# Patient Record
Sex: FEMALE | Race: OTHER | NOT HISPANIC OR LATINO
[De-identification: names, ages, dates, MRNs, and addresses within clinical notes are randomized per-mention and may not be internally consistent; named-entity substitution may affect disease eponyms.]

---

## 2020-07-27 PROBLEM — Z00.00 ENCOUNTER FOR PREVENTIVE HEALTH EXAMINATION: Status: ACTIVE | Noted: 2020-07-27

## 2020-07-30 ENCOUNTER — APPOINTMENT (OUTPATIENT)
Dept: ORTHOPEDIC SURGERY | Facility: CLINIC | Age: 80
End: 2020-07-30
Payer: MEDICARE

## 2020-07-30 VITALS
OXYGEN SATURATION: 98 % | WEIGHT: 135 LBS | BODY MASS INDEX: 24.84 KG/M2 | HEIGHT: 62 IN | SYSTOLIC BLOOD PRESSURE: 118 MMHG | HEART RATE: 62 BPM | DIASTOLIC BLOOD PRESSURE: 50 MMHG

## 2020-07-30 VITALS — TEMPERATURE: 97.9 F

## 2020-07-30 DIAGNOSIS — Z86.79 PERSONAL HISTORY OF OTHER DISEASES OF THE CIRCULATORY SYSTEM: ICD-10-CM

## 2020-07-30 DIAGNOSIS — Z86.39 PERSONAL HISTORY OF OTHER ENDOCRINE, NUTRITIONAL AND METABOLIC DISEASE: ICD-10-CM

## 2020-07-30 DIAGNOSIS — Z82.61 FAMILY HISTORY OF ARTHRITIS: ICD-10-CM

## 2020-07-30 DIAGNOSIS — M19.019 PRIMARY OSTEOARTHRITIS, UNSPECIFIED SHOULDER: ICD-10-CM

## 2020-07-30 DIAGNOSIS — M75.01 ADHESIVE CAPSULITIS OF RIGHT SHOULDER: ICD-10-CM

## 2020-07-30 PROCEDURE — 99204 OFFICE O/P NEW MOD 45 MIN: CPT

## 2020-07-30 RX ORDER — INSULIN ASPART 100 [IU]/ML
100 INJECTION, SOLUTION INTRAVENOUS; SUBCUTANEOUS
Qty: 15 | Refills: 0 | Status: ACTIVE | COMMUNITY
Start: 2020-06-16

## 2020-07-30 RX ORDER — NIFEDIPINE 30 MG/1
30 TABLET, EXTENDED RELEASE ORAL
Qty: 90 | Refills: 0 | Status: ACTIVE | COMMUNITY
Start: 2020-06-16

## 2020-07-30 RX ORDER — MIRTAZAPINE 7.5 MG/1
7.5 TABLET, FILM COATED ORAL
Qty: 90 | Refills: 0 | Status: ACTIVE | COMMUNITY
Start: 2020-05-20

## 2020-07-30 RX ORDER — HALOBETASOL PROPIONATE 0.5 MG/G
0.05 OINTMENT TOPICAL
Qty: 50 | Refills: 0 | Status: ACTIVE | COMMUNITY
Start: 2020-04-07

## 2020-07-30 RX ORDER — METFORMIN HYDROCHLORIDE 1000 MG/1
1000 TABLET, COATED ORAL
Qty: 180 | Refills: 0 | Status: ACTIVE | COMMUNITY
Start: 2020-06-16

## 2020-07-30 RX ORDER — PEN NEEDLE, DIABETIC 32GX 5/32"
NEEDLE, DISPOSABLE MISCELLANEOUS
Qty: 100 | Refills: 0 | Status: ACTIVE | COMMUNITY
Start: 2020-07-16

## 2020-07-30 RX ORDER — KETOCONAZOLE FOAM 20 MG/G
2 AEROSOL, FOAM TOPICAL
Qty: 100 | Refills: 0 | Status: ACTIVE | COMMUNITY
Start: 2020-02-11

## 2020-07-30 RX ORDER — BLOOD-GLUCOSE METER
31G X 6 MM EACH MISCELLANEOUS
Qty: 100 | Refills: 0 | Status: ACTIVE | COMMUNITY
Start: 2020-07-16

## 2020-07-30 RX ORDER — HYDROCHLOROTHIAZIDE 25 MG/1
25 TABLET ORAL
Qty: 90 | Refills: 0 | Status: ACTIVE | COMMUNITY
Start: 2020-07-16

## 2020-07-30 RX ORDER — TRIAMCINOLONE ACETONIDE 1 MG/G
0.1 CREAM TOPICAL
Qty: 30 | Refills: 0 | Status: ACTIVE | COMMUNITY
Start: 2020-02-27

## 2020-07-30 RX ORDER — ATORVASTATIN CALCIUM 40 MG/1
40 TABLET, FILM COATED ORAL
Qty: 30 | Refills: 0 | Status: ACTIVE | COMMUNITY
Start: 2020-06-29

## 2020-07-30 RX ORDER — BLOOD SUGAR DIAGNOSTIC
STRIP MISCELLANEOUS
Qty: 100 | Refills: 0 | Status: ACTIVE | COMMUNITY
Start: 2020-06-16

## 2020-07-30 RX ORDER — INSULIN GLARGINE 100 [IU]/ML
100 INJECTION, SOLUTION SUBCUTANEOUS
Qty: 15 | Refills: 0 | Status: ACTIVE | COMMUNITY
Start: 2020-06-29

## 2020-07-30 RX ORDER — LISINOPRIL 40 MG/1
40 TABLET ORAL
Qty: 90 | Refills: 0 | Status: ACTIVE | COMMUNITY
Start: 2020-06-16

## 2020-07-30 RX ORDER — NITROGLYCERIN 0.3 MG/1
0.3 TABLET SUBLINGUAL
Qty: 100 | Refills: 0 | Status: ACTIVE | COMMUNITY
Start: 2020-07-16

## 2020-07-30 RX ORDER — MELOXICAM 7.5 MG/1
7.5 TABLET ORAL TWICE DAILY
Qty: 60 | Refills: 1 | Status: ACTIVE | COMMUNITY
Start: 2020-07-30 | End: 1900-01-01

## 2020-07-30 NOTE — DISCUSSION/SUMMARY
[de-identified] : The patient is a 79 year old, RHD woman with history of insulin-dependent diabetes mellitus presenting with right shoulder pain.  Her pain is likely secondary to rotator cuff tendinopathy with high grade partial thickness supraspinatus/subscapularis tear, and adhesive capsulitis.\par \par Imaging/Diagnostics were interpreted and results were reviewed with the patient in detail.  All questions were answered appropriately.\par \par The patient was counseled on the natural progression of the problem today.  The patient was provided reassurance today.\par \par Patient was prescribed a short course of Meloxicam .Appropriate use of medication was reviewed with the patient in detail. Risks, benefits, and adverse effects medication were discussed.\par \par Patient was prescribed a course of physical therapy today.  The patient was also provided some general home exercises.  The patient was counseled on activity modification.\par \par Cortisone injection was deferred today given history of insulin dependent diabetes.  I encouraged patient to follow-up with her Endocrinologist.  If she is cleared for cortisone injection, we may consider at next visit.\par \par We discussed surgical options such as arthroscopy, but patient would like a trial of conservative treatment.\par \par Follow-up in 4-6 weeks.\par \par \par ------------------------------------------------------------------------------------------------------------------\par Patient appreciates and agrees with current plan.\par \par This note was generated using a mixture of manual typing and dragon medical dictation software.  A reasonable effort has been made for proofreading its contents, but typos may still remain.  If there are any questions or points of clarification needed please notify my office.\par \par >45 minutes of time was spent on total encounter.  >50% of the visit was spent on counseling/coordination of care and medical-decision making for this patient.\par  [Medication Risks Reviewed] : Medication risks reviewed

## 2020-07-30 NOTE — REVIEW OF SYSTEMS
[Negative] : Endocrine [Joint Pain] : joint pain [Feeling Tired] : fatigue [Sight Problems] : vision problems [Heartburn] : heartburn [Urinary Frequency] : urinary frequency [Sleep Disturbances] : ~T sleep disturbances

## 2020-07-30 NOTE — PHYSICAL EXAM
[de-identified] : General: Well-nourished, well-developed, alert, and in no acute distress.\par Head: Normocephalic.\par Eyes: Pupils equal round reactive to light and accommodation, extraocular muscles intact, normal sclera.\par Nose: No nasal discharge.\par Cardiac: Regular rate. Extremities are warm and well perfused. Distal pulses are symmetric bilaterally.\par Respiratory: No labored breathing.\par Extremities: Sensation is intact distally bilaterally.  Distal pulses are symmetric bilaterally\par Lymphatic: No regional lymphadenopathy, no lymphedema\par Neurologic: No focal deficits\par Skin: Normal skin color, texture, and turgor\par Psychiatric: Normal affect\par MSK: as noted above/below\par \par \par \par RIGHT SHOULDER:\par  \par Inspection:no bruising, rash, erythema, deformity\par Joint Effusion:no\par ROM:DECREASED IN ALL PLANES, FORWARD FLEXION ~130 DEGREES, ABDUCTION ~110 DEGREES, EXTERNAL ROTATION ~45 DEGREES, INTERNAL ROTATION TO SACRUM\par Palpation: PAIN AT SUPRASPINATUS FOOTPINT, PAIN AT GH JOINT LINE, PAIN AT AC JOINT, PAIN AT BICIPITAL GROOVE, NO Clavicle pain , GT pain \par Distal Pulses:normal\par Upper Extremity Reflexes:2+\par Shoulder Strength: 4+/5 \par Upper Extremity Sensation: normal\par \par Special Tests:\par Empty Can: POSITIVE\par Cross Arm: POSITIVE\par Neer: POSITIVE\par Blanca: POSITIVE\par Speed: POSITIVE\par \par \par LEFT SHOULDER:\par  \par Inspection:no bruising, rash, erythema, deformity\par Joint Effusion:no\par ROM:normal Forward Flexion, Abduction , Internal Rotation: , External Rotation \par Palpation: NO AC joint pain, Bicipital Groove Pain , GH joint pain , Clavicle pain , GT pain \par Distal Pulses:normal\par Upper Extremity Reflexes:2+\par Shoulder Strength: 5/5 \par Upper Extremity Sensation: normal\par \par Special Tests:\par Empty Can: Negative \par Lift-Off Test: Negative \par Cross Arm: Negative\par Neer: Negative\par Blanca: Negative\par Speed: Negative\par \par  [de-identified] : Xray RIGHT Shoulder - Multiple views were reviewed with the patient in detail.  There is no acute fracture or dislocation.  There is no joint effusion.  Joint spaces are preserved.\par \par MRI RIGHT SHOULDER from outside facility on 7/16/2020 - Multiple views were reviewed with the patient in detail.  \par \par Focal full-thickness, partial-width tear of the anterior supraspinatus and superior subscapularis fibers about the rotator interval, superimposed on background of severe supraspinatus and moderate subscapularis tendinosis.  Large glenohumeral joint effusion decompresses into the overlying subacromial/subdeltoid bursa and the acromioclavicular joint (geyser sign).  Severe tendinosis of the intra-articular segment of the long head of the biceps tendon.  Medial subluxation of the LHBT related to the aforementioned full-thickness, partial-width subscapulris tear.  Chronic partial tear of the superior labral/biceps anchor.

## 2020-07-30 NOTE — HISTORY OF PRESENT ILLNESS
[de-identified] : The patient is a 79 year old, RHD woman presenting with right shoulder pain.\par \par The patient presents with a two year history of progressively worsening right anterolateral shoulder pain and stiffness.  The patient denies precipitating injury or trauma.\par The patient denies distal numbness, weakness, paresthesias.  She has been having pain with overhead activities, and some pain with activities of daily living like combing her hair.  She has pain when sleeping on the affected side.  She went to her PMD and had xrays which were negative for fracture, with mild AC arthrosis.  This was followed by an MRI which showed:\par \par Focal full-thickness, partial-width tear of the anterior supraspinatus and superior subscapularis fibers about the rotator interval, superimposed on background of severe supraspinatus and moderate subscapularis tendinosis.  Large glenohumeral joint effusion decompresses into the overlying subacromial/subdeltoid bursa and the acromioclavicular joint (geyser sign).  Severe tendinosis of the intra-articular segment of the long head of the biceps tendon.  Medial subluxation of the LHBT related to the aforementioned full-thickness, partial-width subscapulris tear.  Chronic partial tear of the superior labral/biceps anchor.\par \par Pain is rated 7/10, described as achy, shooting, sharp, stabbing, improved with tylenol, worse with lying on affected side.  [7] : a current pain level of 7/10

## 2020-09-10 ENCOUNTER — APPOINTMENT (OUTPATIENT)
Dept: ORTHOPEDIC SURGERY | Facility: CLINIC | Age: 80
End: 2020-09-10
Payer: MEDICARE

## 2020-09-10 VITALS
HEART RATE: 57 BPM | HEIGHT: 62 IN | SYSTOLIC BLOOD PRESSURE: 90 MMHG | DIASTOLIC BLOOD PRESSURE: 40 MMHG | OXYGEN SATURATION: 98 % | WEIGHT: 135 LBS | BODY MASS INDEX: 24.84 KG/M2

## 2020-09-10 DIAGNOSIS — M75.111 INCOMPLETE ROTATOR CUFF TEAR OR RUPTURE OF RIGHT SHOULDER, NOT SPECIFIED AS TRAUMATIC: ICD-10-CM

## 2020-09-10 DIAGNOSIS — M67.911 UNSPECIFIED DISORDER OF SYNOVIUM AND TENDON, RIGHT SHOULDER: ICD-10-CM

## 2020-09-10 PROCEDURE — 99213 OFFICE O/P EST LOW 20 MIN: CPT

## 2020-09-10 NOTE — DISCUSSION/SUMMARY
[Medication Risks Reviewed] : Medication risks reviewed [de-identified] : The patient is a 79 year old, RHD woman with history of insulin-dependent diabetes mellitus presenting with right shoulder pain.  Her pain is likely secondary to rotator cuff tendinopathy with high grade partial thickness supraspinatus/subscapularis tear, and adhesive capsulitis.  She has shown significant clinical improvement with physical therapy.\par \par The patient was counseled on the natural progression of the problem today.  The patient was provided reassurance today.\par \par She was instructed to wean off NSAIDs.\par \par She defers injections today.  I instructed her to follow-up with her Endocrinologist, and get ok for cortisone injection if she desires in the future.\par \par Continue PT/HEP.\par \par Follow-up in 8 weeks.\par \par \par ------------------------------------------------------------------------------------------------------------------\par Patient appreciates and agrees with current plan.\par \par This note was generated using a mixture of manual typing and dragon medical dictation software.  A reasonable effort has been made for proofreading its contents, but typos may still remain.  If there are any questions or points of clarification needed please notify my office.\par \par >15 minutes of time was spent on total encounter.  >50% of the visit was spent on counseling/coordination of care and medical-decision making for this patient.\par

## 2020-09-10 NOTE — HISTORY OF PRESENT ILLNESS
[de-identified] : The patient is a 79 year old, RHD woman presenting for follow-up for right shoulder pain.\par \par The patient was last seen about 5 weeks ago for a two year history of progressively worsening right anterolateral shoulder pain and stiffness.   She had been having pain with overhead activities, and some pain with activities of daily living like combing her hair.  She initially went to her PMD and had xrays which were negative for fracture, with mild AC arthrosis.  This was followed by an MRI which showed:\par \par Focal full-thickness, partial-width tear of the anterior supraspinatus and superior subscapularis fibers about the rotator interval, superimposed on background of severe supraspinatus and moderate subscapularis tendinosis.  Large glenohumeral joint effusion decompresses into the overlying subacromial/subdeltoid bursa and the acromioclavicular joint (geyser sign).  Severe tendinosis of the intra-articular segment of the long head of the biceps tendon.  Medial subluxation of the LHBT related to the aforementioned full-thickness, partial-width subscapularis tear.  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  She has seen significant improvement in her ROM and function with ADL.  She still has some residual pain.

## 2020-09-10 NOTE — PHYSICAL EXAM
[de-identified] : General: Well-nourished, well-developed, alert, and in no acute distress.\par Head: Normocephalic.\par Eyes: Pupils equal round reactive to light and accommodation, extraocular muscles intact, normal sclera.\par Nose: No nasal discharge.\par Cardiac: Regular rate. Extremities are warm and well perfused. Distal pulses are symmetric bilaterally.\par Respiratory: No labored breathing.\par Extremities: Sensation is intact distally bilaterally.  Distal pulses are symmetric bilaterally\par Lymphatic: No regional lymphadenopathy, no lymphedema\par Neurologic: No focal deficits\par Skin: Normal skin color, texture, and turgor\par Psychiatric: Normal affect\par MSK: as noted above/below\par \par \par \par RIGHT SHOULDER:\par  \par Inspection:no bruising, rash, erythema, deformity\par Joint Effusion:no\par ROM:IMPROVED ALL PLANES, FORWARD FLEXION  FROM ~130 DEGREES, ABDUCTION  FROM ~110 DEGREES, EXTERNAL ROTATION ~70 FORM 45 DEGREES, INTERNAL ROTATION TO LUMBAR FROM SACRUM\par Palpation: MILD PAIN AT SUPRASPINATUS FOOTPINT, PAIN AT GH JOINT LINE, NO Clavicle pain , GT pain \par Distal Pulses:normal\par Upper Extremity Reflexes:2+\par Shoulder Strength: 5-/5 \par Upper Extremity Sensation: normal\par \par Special Tests:\par Empty Can: POSITIVE\par Cross Arm: POSITIVE\par Neer: NEGATIVE\par Blanca: POSITIVE\par Speed: NEGATIVE\par \par \par LEFT SHOULDER:\par  \par Inspection:no bruising, rash, erythema, deformity\par Joint Effusion:no\par ROM:normal Forward Flexion, Abduction , Internal Rotation: , External Rotation \par Palpation: NO AC joint pain, Bicipital Groove Pain , GH joint pain , Clavicle pain , GT pain \par Distal Pulses:normal\par Upper Extremity Reflexes:2+\par Shoulder Strength: 5/5 \par Upper Extremity Sensation: normal\par \par Special Tests:\par Empty Can: Negative \par Lift-Off Test: Negative \par Cross Arm: Negative\par Neer: Negative\par Blanca: Negative\par Speed: Negative\par \par